# Patient Record
Sex: MALE | Race: WHITE | URBAN - METROPOLITAN AREA
[De-identification: names, ages, dates, MRNs, and addresses within clinical notes are randomized per-mention and may not be internally consistent; named-entity substitution may affect disease eponyms.]

---

## 2018-02-20 ENCOUNTER — APPOINTMENT (RX ONLY)
Dept: URBAN - METROPOLITAN AREA CLINIC 310 | Facility: CLINIC | Age: 53
Setting detail: DERMATOLOGY
End: 2018-02-20

## 2018-02-20 DIAGNOSIS — L30.2 CUTANEOUS AUTOSENSITIZATION: ICD-10-CM

## 2018-02-20 PROCEDURE — 99201: CPT

## 2018-02-20 PROCEDURE — ? PRESCRIPTION

## 2018-02-20 RX ORDER — CEPHALEXIN 500 MG/1
TABLET ORAL
Qty: 21 | Refills: 0 | Status: ERX | COMMUNITY
Start: 2018-02-20

## 2018-02-20 RX ORDER — TRIAMCINOLONE ACETONIDE 1 MG/G
OINTMENT TOPICAL
Qty: 1 | Refills: 1 | Status: ERX | COMMUNITY
Start: 2018-02-20

## 2018-02-20 RX ADMIN — CEPHALEXIN: 500 TABLET ORAL at 18:02

## 2018-02-20 RX ADMIN — TRIAMCINOLONE ACETONIDE: 1 OINTMENT TOPICAL at 18:02

## 2018-02-20 ASSESSMENT — LOCATION DETAILED DESCRIPTION DERM
LOCATION DETAILED: LEFT LATERAL ABDOMEN
LOCATION DETAILED: RIGHT PROXIMAL DORSAL FOREARM
LOCATION DETAILED: LEFT PROXIMAL DORSAL FOREARM
LOCATION DETAILED: LEFT SUPERIOR MEDIAL MIDBACK

## 2018-02-20 ASSESSMENT — LOCATION SIMPLE DESCRIPTION DERM
LOCATION SIMPLE: RIGHT FOREARM
LOCATION SIMPLE: ABDOMEN
LOCATION SIMPLE: LEFT LOWER BACK
LOCATION SIMPLE: LEFT FOREARM

## 2018-02-20 ASSESSMENT — LOCATION ZONE DERM
LOCATION ZONE: ARM
LOCATION ZONE: TRUNK

## 2019-09-10 ENCOUNTER — APPOINTMENT (RX ONLY)
Dept: URBAN - METROPOLITAN AREA CLINIC 310 | Facility: CLINIC | Age: 54
Setting detail: DERMATOLOGY
End: 2019-09-10

## 2019-09-10 ENCOUNTER — RX ONLY (OUTPATIENT)
Age: 54
Setting detail: RX ONLY
End: 2019-09-10

## 2019-09-10 DIAGNOSIS — L738 OTHER SPECIFIED DISEASES OF HAIR AND HAIR FOLLICLES: ICD-10-CM

## 2019-09-10 DIAGNOSIS — L663 OTHER SPECIFIED DISEASES OF HAIR AND HAIR FOLLICLES: ICD-10-CM

## 2019-09-10 DIAGNOSIS — L73.9 FOLLICULAR DISORDER, UNSPECIFIED: ICD-10-CM

## 2019-09-10 PROBLEM — L02.92 FURUNCLE, UNSPECIFIED: Status: ACTIVE | Noted: 2019-09-10

## 2019-09-10 PROCEDURE — 99212 OFFICE O/P EST SF 10 MIN: CPT

## 2019-09-10 PROCEDURE — ? PRESCRIPTION

## 2019-09-10 RX ORDER — MINOCYCLINE HYDROCHLORIDE 100 MG/1
CAPSULE ORAL
Qty: 60 | Refills: 0

## 2019-09-10 RX ORDER — MINOCYCLINE HYDROCHLORIDE 100 MG/1
CAPSULE ORAL
Qty: 60 | Refills: 0 | Status: ERX | COMMUNITY
Start: 2019-09-10

## 2019-09-10 RX ADMIN — MINOCYCLINE HYDROCHLORIDE: 100 CAPSULE ORAL at 21:01

## 2020-09-05 ENCOUNTER — APPOINTMENT (OUTPATIENT)
Dept: RADIOLOGY | Facility: HOSPITAL | Age: 55
End: 2020-09-05
Payer: OTHER GOVERNMENT

## 2020-09-05 ENCOUNTER — HOSPITAL ENCOUNTER (EMERGENCY)
Facility: HOSPITAL | Age: 55
Discharge: HOME | End: 2020-09-06
Attending: EMERGENCY MEDICINE
Payer: OTHER GOVERNMENT

## 2020-09-05 DIAGNOSIS — E86.0 DEHYDRATION: ICD-10-CM

## 2020-09-05 DIAGNOSIS — F11.93 NARCOTIC WITHDRAWAL (CMS/HCC): Primary | ICD-10-CM

## 2020-09-05 DIAGNOSIS — D72.828 NEUTROPHILIA: ICD-10-CM

## 2020-09-05 DIAGNOSIS — F11.10 NARCOTIC ABUSE (CMS/HCC): ICD-10-CM

## 2020-09-05 DIAGNOSIS — R73.9 HYPERGLYCEMIA: ICD-10-CM

## 2020-09-05 DIAGNOSIS — R03.0 ELEVATED BLOOD PRESSURE READING: ICD-10-CM

## 2020-09-05 LAB
ALBUMIN SERPL-MCNC: 4 G/DL (ref 3.5–5.3)
ALP SERPL-CCNC: 62 U/L (ref 45–115)
ALT SERPL-CCNC: 19 U/L (ref 7–52)
AMPHET UR QL SCN: ABNORMAL
ANION GAP SERPL CALC-SCNC: 9 MMOL/L (ref 3–11)
AST SERPL-CCNC: 27 U/L
BACTERIA #/AREA URNS AUTO: NORMAL /HPF
BARBITURATES UR QL SCN: ABNORMAL
BASOPHILS # BLD AUTO: 0 10*3/UL
BASOPHILS NFR BLD AUTO: 1 % (ref 0–2)
BENZODIAZ UR QL SCN: POSITIVE
BILIRUB SERPL-MCNC: 0.73 MG/DL (ref 0.2–1.4)
BILIRUB UR QL STRIP.AUTO: NEGATIVE
BUN SERPL-MCNC: 24 MG/DL (ref 7–25)
CALCIUM ALBUM COR SERPL-MCNC: 9.1 MG/DL (ref 8.6–10.3)
CALCIUM SERPL-MCNC: 9.1 MG/DL (ref 8.6–10.3)
CANNABINOIDS UR QL SCN: ABNORMAL
CHLORIDE SERPL-SCNC: 102 MMOL/L (ref 98–107)
CLARITY UR: CLEAR
CO2 SERPL-SCNC: 30 MMOL/L (ref 21–32)
COCAINE UR QL SCN: ABNORMAL
COLOR UR: YELLOW
CREAT SERPL-MCNC: 1.19 MG/DL (ref 0.7–1.3)
EOSINOPHIL # BLD AUTO: 0 10*3/UL
EOSINOPHIL NFR BLD AUTO: 0 % (ref 0–3)
ERYTHROCYTE [DISTWIDTH] IN BLOOD BY AUTOMATED COUNT: 13.4 % (ref 11.5–15)
GFR SERPL CREATININE-BSD FRML MDRD: 70 ML/MIN/1.73M*2
GLUCOSE SERPL-MCNC: 108 MG/DL (ref 70–105)
GLUCOSE UR STRIP.AUTO-MCNC: NEGATIVE MG/DL
HCT VFR BLD AUTO: 49 % (ref 38–50)
HGB BLD-MCNC: 17.1 G/DL (ref 13.2–17.2)
HGB UR QL STRIP.AUTO: NEGATIVE
KETONES UR STRIP.AUTO-MCNC: 20 MG/DL
LEUKOCYTE ESTERASE UR QL STRIP: NEGATIVE
LYMPHOCYTES # BLD AUTO: 1 10*3/UL
LYMPHOCYTES NFR BLD AUTO: 11 % (ref 15–47)
MCH RBC QN AUTO: 32.2 PG (ref 29–34)
MCHC RBC AUTO-ENTMCNC: 34.9 G/DL (ref 32–36)
MCV RBC AUTO: 92.3 FL (ref 82–97)
METHADONE UR QL SCN: ABNORMAL
METHAMPHET UR QL SCN: ABNORMAL
MONOCYTES # BLD AUTO: 0.8 10*3/UL
MONOCYTES NFR BLD AUTO: 8 % (ref 5–13)
NEUTROPHILS # BLD AUTO: 7.3 10*3/UL
NEUTROPHILS NFR BLD AUTO: 80 % (ref 46–70)
NITRITE UR QL STRIP.AUTO: NEGATIVE
OPIATES UR QL SCN: ABNORMAL
OXYCODONE UR QL SCN: ABNORMAL
PCP UR QL SCN: ABNORMAL
PH UR STRIP.AUTO: 7 PH
PLATELET # BLD AUTO: 210 10*3/UL (ref 130–350)
PMV BLD AUTO: 8.3 FL (ref 6.9–10.8)
POTASSIUM SERPL-SCNC: 3.6 MMOL/L (ref 3.5–5.1)
PROPOXYPH UR QL SCN: ABNORMAL
PROT SERPL-MCNC: 6.3 G/DL (ref 6–8.3)
PROT UR STRIP.AUTO-MCNC: NEGATIVE MG/DL
RBC # BLD AUTO: 5.3 10*6/ΜL (ref 4.1–5.8)
RBC #/AREA URNS AUTO: NORMAL /HPF
SARS-COV-2 RNA RESP QL NAA+PROBE: NEGATIVE
SODIUM SERPL-SCNC: 141 MMOL/L (ref 135–145)
SP GR UR STRIP.AUTO: 1.01 (ref 1–1.03)
SQUAMOUS #/AREA URNS AUTO: NORMAL /HPF
TRICYCLICS UR QL SCN: ABNORMAL
TROPONIN I SERPL-MCNC: 11.3 PG/ML
UROBILINOGEN UR STRIP.AUTO-MCNC: <2 E.U./DL
WBC # BLD AUTO: 9.1 10*3/UL (ref 3.7–9.6)
WBC #/AREA URNS AUTO: NORMAL /HPF

## 2020-09-05 PROCEDURE — 85025 COMPLETE CBC W/AUTO DIFF WBC: CPT | Performed by: EMERGENCY MEDICINE

## 2020-09-05 PROCEDURE — 80306 DRUG TEST PRSMV INSTRMNT: CPT | Performed by: EMERGENCY MEDICINE

## 2020-09-05 PROCEDURE — 6360000200 HC RX 636 W HCPCS (ALT 250 FOR IP): Performed by: EMERGENCY MEDICINE

## 2020-09-05 PROCEDURE — G0480 DRUG TEST DEF 1-7 CLASSES: HCPCS | Performed by: EMERGENCY MEDICINE

## 2020-09-05 PROCEDURE — 71045 X-RAY EXAM CHEST 1 VIEW: CPT

## 2020-09-05 PROCEDURE — 84484 ASSAY OF TROPONIN QUANT: CPT | Performed by: EMERGENCY MEDICINE

## 2020-09-05 PROCEDURE — 80053 COMPREHEN METABOLIC PANEL: CPT | Performed by: EMERGENCY MEDICINE

## 2020-09-05 PROCEDURE — C9803 HOPD COVID-19 SPEC COLLECT: HCPCS | Performed by: EMERGENCY MEDICINE

## 2020-09-05 PROCEDURE — 2580000300 HC RX 258: Performed by: EMERGENCY MEDICINE

## 2020-09-05 PROCEDURE — 87635 SARS-COV-2 COVID-19 AMP PRB: CPT | Performed by: EMERGENCY MEDICINE

## 2020-09-05 PROCEDURE — 81001 URINALYSIS AUTO W/SCOPE: CPT | Performed by: EMERGENCY MEDICINE

## 2020-09-05 PROCEDURE — 93005 ELECTROCARDIOGRAM TRACING: CPT | Performed by: EMERGENCY MEDICINE

## 2020-09-05 PROCEDURE — 36415 COLL VENOUS BLD VENIPUNCTURE: CPT | Performed by: EMERGENCY MEDICINE

## 2020-09-05 PROCEDURE — 99285 EMERGENCY DEPT VISIT HI MDM: CPT | Performed by: EMERGENCY MEDICINE

## 2020-09-05 RX ORDER — SODIUM CHLORIDE 9 MG/ML
2000 INJECTION, SOLUTION INTRAVENOUS ONCE
Status: COMPLETED | OUTPATIENT
Start: 2020-09-05 | End: 2020-09-06

## 2020-09-05 RX ORDER — LORAZEPAM 2 MG/ML
1 INJECTION INTRAMUSCULAR ONCE
Status: COMPLETED | OUTPATIENT
Start: 2020-09-05 | End: 2020-09-05

## 2020-09-05 RX ORDER — ONDANSETRON HYDROCHLORIDE 2 MG/ML
4 INJECTION, SOLUTION INTRAVENOUS ONCE
Status: COMPLETED | OUTPATIENT
Start: 2020-09-05 | End: 2020-09-05

## 2020-09-05 RX ADMIN — ONDANSETRON 4 MG: 2 INJECTION INTRAMUSCULAR; INTRAVENOUS at 21:48

## 2020-09-05 RX ADMIN — SODIUM CHLORIDE 2000 ML: 9 INJECTION, SOLUTION INTRAVENOUS at 21:48

## 2020-09-05 RX ADMIN — LORAZEPAM 1 MG: 2 INJECTION INTRAMUSCULAR; INTRAVENOUS at 21:48

## 2020-09-06 VITALS
OXYGEN SATURATION: 95 % | RESPIRATION RATE: 18 BRPM | BODY MASS INDEX: 30.16 KG/M2 | HEART RATE: 64 BPM | TEMPERATURE: 98.6 F | DIASTOLIC BLOOD PRESSURE: 92 MMHG | SYSTOLIC BLOOD PRESSURE: 168 MMHG | HEIGHT: 72 IN | WEIGHT: 222.66 LBS

## 2020-09-06 PROCEDURE — 99285 EMERGENCY DEPT VISIT HI MDM: CPT

## 2020-09-06 PROCEDURE — 96361 HYDRATE IV INFUSION ADD-ON: CPT

## 2020-09-06 PROCEDURE — 96375 TX/PRO/DX INJ NEW DRUG ADDON: CPT

## 2020-09-06 PROCEDURE — 96374 THER/PROPH/DIAG INJ IV PUSH: CPT

## 2020-09-06 RX ORDER — METOCLOPRAMIDE 10 MG/1
10 TABLET ORAL 4 TIMES DAILY
Qty: 12 TABLET | Refills: 0 | Status: SHIPPED | OUTPATIENT
Start: 2020-09-06 | End: 2020-09-16

## 2020-09-06 RX ORDER — CLONIDINE HYDROCHLORIDE 0.1 MG/1
0.1 TABLET ORAL 4 TIMES DAILY
Qty: 12 TABLET | Refills: 0 | Status: SHIPPED | OUTPATIENT
Start: 2020-09-06 | End: 2021-09-06

## 2020-09-06 RX ORDER — LORAZEPAM 1 MG/1
1 TABLET ORAL EVERY 6 HOURS PRN
Qty: 12 TABLET | Refills: 0 | Status: SHIPPED | OUTPATIENT
Start: 2020-09-06 | End: 2020-09-09

## 2020-09-06 NOTE — DISCHARGE INSTRUCTIONS
Drink plenty of fluids.  Take your medicines as needed.  Follow up with your primary medical doctor.  You will be given a Uber ride to [facility].

## 2020-09-06 NOTE — ED PROVIDER NOTES
"Delirium Tremens (DTS) (PT arrives via EMS from [facility]. PT has hx of opiod abuse has been clean for 4 days. PT repros smoking \"fake fent,\" which he buys from a dealer in [place] where he is from. PT reports he had gradually started to feel worse since he quit smoking. Pt reports abd pain RUQ RLQ, N/V/D. Pt recieved 2 liters NS en route from [facility]. 4 zofran, 1 mg ativan. Pt has hx of TBI, chronic Head neck and back pain. )        HPI: Opioid Withdrawals.  This is a 54 year old male presenting via EMS from [place] to the Emergency Department with opioid withdrawals.    He is having right upper and lower quadrant pain along with nausea, shortness of breath, chest pain, myalgias, vomiting (x7) and diarrhea (x2-3) since Tuesday afternoon (2 days ago). He denies any fevers, He has a history of opioid abuse but states he has been clean for four days. He reports smoking Fentanyl pills which he buys from a dealer in [place].     He states he came up here for rehab but has had gradually began to feel worse since he quit smoking.He denies shooting methamphetamines or medical history. He recieved 2 L normal saline, 4 Zofran and 1 mg Ativan. He has a history of TBI, chronic head, neck and back pain.      Past Medical History:   Diagnosis Date   • Injury of back    • Migraine        No past surgical history on file.    Social History     Socioeconomic History   • Marital status:      Spouse name: Not on file   • Number of children: Not on file   • Years of education: Not on file   • Highest education level: Not on file   Occupational History   • Not on file   Social Needs   • Financial resource strain: Not on file   • Food insecurity     Worry: Not on file     Inability: Not on file   • Transportation needs     Medical: Not on file     Non-medical: Not on file   Tobacco Use   • Smoking status: Not on file   Substance and Sexual Activity   • Alcohol use: Not on file   • Drug use: " Not on file   • Sexual activity: Not on file   Lifestyle   • Physical activity     Days per week: Not on file     Minutes per session: Not on file   • Stress: Not on file   Relationships   • Social connections     Talks on phone: Not on file     Gets together: Not on file     Attends Hinduism service: Not on file     Active member of club or organization: Not on file     Attends meetings of clubs or organizations: Not on file     Relationship status: Not on file   • Intimate partner violence     Fear of current or ex partner: Not on file     Emotionally abused: Not on file     Physically abused: Not on file     Forced sexual activity: Not on file   Other Topics Concern   • Not on file   Social History Narrative   • Not on file       No family history on file.    Allergies   Allergen Reactions   • Levaquin [Levofloxacin] Hives         Current Outpatient Medications:   •  cloNIDine (Catapres) 0.1 mg tablet, Take 1 tablet (0.1 mg total) by mouth 4 (four) times a day As needed for withdrawal, Disp: 12 tablet, Rfl: 0  •  metoclopramide (Reglan) 10 mg tablet, Take 1 tablet (10 mg total) by mouth 4 (four) times a day for 10 days, Disp: 12 tablet, Rfl: 0      ROS:  Constitutional: negative for fever  Eyes: negative for visual changes  ENT: negative for sore throat  Cardiovascular: positive for chest pain  Respiratory: positive for shortness of breath   GI: positive for abdominal pain, positive for nausea, vomiting and diarrhea  : negative for hematuria  Musculoskeletal: negative for back pain, positive for myalgias  Neuro: negative for headache  Hematology: negative for bleeding  Immunology: negative for joint pain      ED Triage Vitals   Temp Heart Rate Resp BP SpO2   09/05/20 2108 09/05/20 2108 09/05/20 2108 09/05/20 2108 09/05/20 2108   37 °C (98.6 °F) 63 17 148/97 92 %      Temp Source Heart Rate Source Patient Position BP Location FiO2 (%)   09/05/20 2108 -- 09/06/20 0057 -- --   Oral  Head of bed 30 degrees or  higher           Physical Exam:  Nursing note and vitals reviewed.  Constitutional: Nontoxic but uncomfortable appearing male.  Head: Normocephalic and atraumatic. Mucous membranes are dry.   Eyes: Pupils are equal, round, and reactive to light.   Neck: Supple.  Cardiovascular: Regular rate and rhythm without murmur.   Pulmonary/Chest: No respiratory distress.  Clear to auscultation bilaterally.  Abdominal: Soft and nontender.    Back: No CVA tenderness. No midline tenderness.   Musculoskeletal: No edema  Neurological: Alert.   Skin: Skin is warm and dry. No rash noted.   Psychiatric: Normal mood and affect.          Labs:  Labs Reviewed   COMPREHENSIVE METABOLIC PANEL - Abnormal       Result Value    Sodium 141      Potassium 3.6      Chloride 102      CO2 30      Anion Gap 9      BUN 24      Creatinine 1.19      Glucose 108 (*)     Calcium 9.1      AST 27      ALT (SGPT) 19      Alkaline Phosphatase 62      Total Protein 6.3      Albumin 4.0      Total Bilirubin 0.73      eGFR 70      Corrected Calcium 9.1      Narrative:     Estimated GFR calculated using the 2009 CKD-EPI creatinine equation.   CBC WITH AUTO DIFFERENTIAL - Abnormal    WBC 9.1      RBC 5.30      Hemoglobin 17.1      Hematocrit 49.0      MCV 92.3      MCH 32.2      MCHC 34.9      RDW 13.4      Platelets 210      MPV 8.3      Neutrophils% 80 (*)     Lymphocytes% 11 (*)     Monocytes% 8      Eosinophils% 0      Basophils% 1      Neutrophils Absolute 7.30      Lymphocytes Absolute 1.00      Monocytes Absolute 0.80      Eosinophils Absolute 0.00      Basophils Absolute 0.00     URINE DRUG SCREEN, MEDICAL - Abnormal    Amphetamine Screen, Urine None Detected      Benzodiazepines Screen, Urine Positive (*)     Cannabinoid Screen, Urine None Detected      Cocaine Screen, Urine None Detected      Barbiturate Screen, Urine None Detected      Opiate Screen, Urine None Detected      PCP Screen, Urine None Detected      Methadone Screen, Urine None Detected       Oxycodone Screen, Urine None Detected      TCA Screen, Urine None Detected      Methamphetamine Screen Urine None Detected      Propoxyphene Screen, Urine None Detected      Narrative:     For medical diagnostic use only.     Positives are presumptive, confirmatory testing available upon request.    Detection Limits:   BURT 150 ng/mL   PCP 25 ng/mL    ng/mL    ng/mL   THC 50 ng/mL    ng/mL    ng/mL    ng/mL   mAMP 500 ng/mL    ng/mL    ng/mL    ng/mL      URINALYSIS, DIPSTICK ONLY, FOR USE WITH MICROSCOPIC PANEL - Abnormal    Color, Urine Yellow      Clarity, Urine Clear      Specific Gravity, Urine 1.015      Leukocytes, Urine Negative      Nitrite, Urine Negative      Protein, Urine Negative      Ketones, Urine 20  (*)     Urobilinogen, Urine <2.0      Bilirubin, Urine Negative      Blood, Urine Negative      Glucose, Urine Negative      pH, Urine 7.0     COVID-19 MOLECULAR (PCR) - Normal    COVID-19 PCR Negative      Narrative:     COVID-19 NP sample collection can be used for Respiratory Pathogen Panel testing.  A negative result may not rule out COVID-19 in the patient, as the sensitivity of the test depends on the timing of the specimen collection, type of specimen, and the quality of the specimen. Result should be correlated with patient's history and clinical presentation.    Performance of this SARS-CoV-2 RNA test has only been established in individuals suspected of having COVID-19 by their healthcare provider.    Testing was performed using the Xpert Xpress SARS-CoV-2 RT-PCR assay ([employer]) on the GeneXpert Dx system.  Fact sheets for this Emergency Use Authorization (EUA) assay can be found at the following links:  For Healthcare Providers  https://www.fda.gov/media/588458/download  For Patients  https://www.fda.gov/media/819980/download   HIGH SENSITIVE TROPONIN I - Normal    hsTnI 0 Hour 11.3     URINALYSIS, MICROSCOPIC ONLY - Normal    RBC,  Urine 0-2      WBC, Urine 0-4      Squamous Epithelial, Urine 0-4      Bacteria, Urine None seen     URINALYSIS WITH MICROSCOPIC    Narrative:     The following orders were created for panel order Urinalysis w/microscopic Urine, Clean Catch.  Procedure                               Abnormality         Status                     ---------                               -----------         ------                     Urinalysis, microscopic U...[[id number]]  Normal              Final result               Urinalysis, dipstick Urin...[[id number]]  Abnormal            Final result                 Please view results for these tests on the individual orders.       Imaging:  XR chest portable 1 view   Final Result   IMPRESSION:   1. Negative chest             MDM:    This is a 54 year old male opiate abuser who was sent from [facility] after being there for three days and exhibiting withdrawal symptome. Ppt will be given 2 L normal saline. Baseline labs will be checked and he will be conserved.    Patient looks improved. Labs were without acute life threatening abnormalities. Chest x-ray normal. Patient will be discharged home with Clonidine, Ativan, and Reglan. Patient is to follow up with his primary care physician. Reasons to return to the Emergency Department were discussed at length.    [facility] dom[facility].  He will be discharged.  They have arranged for an Uber to give him a ride to [place].       Given   Medications   sodium chloride 0.9 % bolus 2,000 mL (0 mL intravenous Stopped 9/6/20 0154)   ondansetron (ZOFRAN) injection 4 mg (4 mg intravenous Given 9/5/20 2148)   LORazepam (ATIVAN) injection 1 mg (1 mg intravenous Given 9/5/20 2148)         Procedure    ECG 12 lead, palpitations    Date/Time: 9/5/2020 10:05 PM  Performed by: Leon Cavanaugh MD  Authorized by: Leon Cavanaugh MD     ECG reviewed by ED Physician in the absence of a  cardiologist: yes    Comments:      Sinus rhythm 67; leftward axis deviation; slow R-wave progression; flattening of lateral T-waves.            Clinical Impression:    Final diagnoses:   [F11.23] Narcotic withdrawal (CMS/HCC) (HCC)   [F11.10] Narcotic abuse (CMS/HCC) (HCC)   [R73.9] Hyperglycemia   [R03.0] Elevated blood pressure reading   [D72.9] Neutrophilia   [E86.0] Dehydration            Leon Cavanaugh MD  09/06/20 1994